# Patient Record
Sex: FEMALE | Race: WHITE
[De-identification: names, ages, dates, MRNs, and addresses within clinical notes are randomized per-mention and may not be internally consistent; named-entity substitution may affect disease eponyms.]

---

## 2021-01-24 ENCOUNTER — HOSPITAL ENCOUNTER (INPATIENT)
Dept: HOSPITAL 53 - M ED | Age: 66
LOS: 3 days | Discharge: HOME | DRG: 337 | End: 2021-01-27
Attending: FAMILY MEDICINE | Admitting: GENERAL PRACTICE
Payer: MEDICARE

## 2021-01-24 VITALS — BODY MASS INDEX: 22.73 KG/M2 | WEIGHT: 128.31 LBS | HEIGHT: 63 IN

## 2021-01-24 VITALS — DIASTOLIC BLOOD PRESSURE: 77 MMHG | SYSTOLIC BLOOD PRESSURE: 157 MMHG

## 2021-01-24 DIAGNOSIS — I10: ICD-10-CM

## 2021-01-24 DIAGNOSIS — K21.9: ICD-10-CM

## 2021-01-24 DIAGNOSIS — F17.200: ICD-10-CM

## 2021-01-24 DIAGNOSIS — Z88.6: ICD-10-CM

## 2021-01-24 DIAGNOSIS — K56.52: Primary | ICD-10-CM

## 2021-01-24 DIAGNOSIS — Z79.899: ICD-10-CM

## 2021-01-24 DIAGNOSIS — Z90.49: ICD-10-CM

## 2021-01-24 DIAGNOSIS — Z88.5: ICD-10-CM

## 2021-01-24 LAB
ALBUMIN SERPL BCG-MCNC: 3.8 GM/DL (ref 3.2–5.2)
ALT SERPL W P-5'-P-CCNC: 20 U/L (ref 12–78)
BASOPHILS # BLD AUTO: 0 10^3/UL (ref 0–0.2)
BASOPHILS NFR BLD AUTO: 0.3 % (ref 0–1)
BILIRUB CONJ SERPL-MCNC: 0.1 MG/DL (ref 0–0.2)
BILIRUB SERPL-MCNC: 0.5 MG/DL (ref 0.2–1)
EOSINOPHIL # BLD AUTO: 0 10^3/UL (ref 0–0.5)
EOSINOPHIL NFR BLD AUTO: 0.1 % (ref 0–3)
HCT VFR BLD AUTO: 40.4 % (ref 36–47)
HGB BLD-MCNC: 13.2 G/DL (ref 12–15.5)
LIPASE SERPL-CCNC: 85 U/L (ref 73–393)
LYMPHOCYTES # BLD AUTO: 2.2 10^3/UL (ref 1.5–5)
LYMPHOCYTES NFR BLD AUTO: 18.6 % (ref 24–44)
MCH RBC QN AUTO: 29.7 PG (ref 27–33)
MCHC RBC AUTO-ENTMCNC: 32.7 G/DL (ref 32–36.5)
MCV RBC AUTO: 91 FL (ref 80–96)
MONOCYTES # BLD AUTO: 1.3 10^3/UL (ref 0–0.8)
MONOCYTES NFR BLD AUTO: 11.1 % (ref 0–5)
NEUTROPHILS # BLD AUTO: 8.3 10^3/UL (ref 1.5–8.5)
NEUTROPHILS NFR BLD AUTO: 69.6 % (ref 36–66)
PLATELET # BLD AUTO: 376 10^3/UL (ref 150–450)
PROT SERPL-MCNC: 7 GM/DL (ref 6.4–8.2)
RBC # BLD AUTO: 4.44 10^6/UL (ref 4–5.4)
WBC # BLD AUTO: 11.9 10^3/UL (ref 4–10)

## 2021-01-24 RX ADMIN — NICOTINE SCH PATCH: 7 PATCH, EXTENDED RELEASE TRANSDERMAL at 20:32

## 2021-01-24 RX ADMIN — POTASSIUM CHLORIDE, DEXTROSE MONOHYDRATE AND SODIUM CHLORIDE SCH MLS/HR: 75; 5; 450 INJECTION, SOLUTION INTRAVENOUS at 20:16

## 2021-01-24 NOTE — HPEPDOC
[facility] Medical History & Physical


Date of Admission


2021


Date of Service:  2021





History and Physical


CHIEF COMPLAINT: Nausea, vomiting, abdominal distention for 3 days





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

gastroesophageal reflux disease, exploratory laparotomy for peritonitis that is 

post cholecystectomy presents to the emergency room with 3 day history of nause

a, vomiting, abdominal distention, unable to take oral intake without prior 

episodes in the past. Patient denies any history of hernias. No medications were

taken at home, abdominal discomfort, worse when she ambulates better when she 

lays still in the ER she was found to have bowel obstruction, general surgeon, 

Dr. Infante consulted and recommended conservative management with nothing by 

mouth status IV fluids, nasogastric tube placement to low intermittent suction. 

Hospitalist was called to admit the patient. She denies chest pain, pressure, 

tightness, fever, chills, shortness of breath, dysuria, urgency, frequency, 

bilateral upper and lower extremity weakness, paresthesias, depression, anxiety,

sore throat, tinnitus, changes in vision. 12 point review of system otherwise 

negative





PAST MEDICAL HISTORY:


Hypertension, reflux, peritonitis 





PAST SURGICAL HISTORY:


Cholecystostomy exploratory laparotomy for peritonitis, status post cystectomy





SOCIAL HISTORY:


Smokes one fourth pack per day to a half a pack per day since age of 18. Retired

RN. Denies any alcohol use. Full Code





FAMILY HISTORY:


Father:  renal cell carcinoma. Age 83 


Mother: , CVA age 60








ALLERGIES: Please see below.





REVIEW OF SYSTEMS:


12 point review of system negative assessment positive findings in history of 

presenting illness





HOME MEDICATIONS: Please see below. 





PHYSICAL EXAMINATION:


VITAL SIGNS: See below


GENERAL APPEARANCE: No respiratory distress. Awake, alert, oriented 3, 

nasogastric tube in place


HEENT: No JVD, no thyromegaly. Pupils equally round and reactive to light 

accommodation. Dry mucous membranes.


CARDIOVASCULAR: S1, S2, sinus rhythm, no murmurs, rubs or gallops


LUNGS: Clear to auscultation. No wheezing, rales or rhonchi


ABDOMEN: Nasogastric tube in place Soft, distended. Hypoactive bowel sounds


EXTREMITIES: No cyanosis, clubbing or pitting edema 





LABORATORY DATA: See below.





MICROBIOLOGY: Please see below. 





ASSESSMENT: 


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 

disease, exploratory laparotomy for peritonitis that is post cholecystectomy 

presents to the emergency room with 3 day history of nausea, vomiting, abdominal

distention, unable to take oral intake without prior episodes in the past. 

Patient denies any history of hernias. No medications were taken at home, 

abdominal discomfort, worse when she ambulates better when she lays still in the

ER she was found to have bowel obstruction, general surgeon, Dr. Infante consult

ed and recommended conservative management with nothing by mouth status IV 

fluids, nasogastric tube placement to low intermittent suction. Hospitalist was 

called to admit the patient. She denies chest pain, pressure, tightness, fever, 

chills, shortness of breath, dysuria, urgency, frequency, bilateral upper and 

lower extremity weakness, paresthesias, depression, anxiety, sore throat, 

tinnitus, changes in vision. 12 point review of system otherwise negative.. She 

has admitted as an inpatient for two midnights for small bowel obstruction





Small bowel obstruction


General surgeon, Dr.Daniel Costa has been consulted. She'll be kept nothing by 

mouth with IV fluids, antiemetics when necessary pain meds, NG tube to low 

intermittent suction. Monitor electrolytes and supplement intravenously as nee

ded IV Zofran 4 hourly





 Hypertension, controlled


May use nitroglycerin 2% topically every 4 hourly systolic pressures greater 

than 150 or diastolic greater than 90, due to nothing by mouth status.





Gastroesophageal reflux


Protonix X 40 mg IV daily





Active tobacco abuse


Tobacco cessation counseling. Nicotine replacement therapy





DVT prophylaxis


Compression stockings





CODE STATUS full code





Vital Signs





Vital Signs








  Date Time  Temp Pulse Resp B/P (MAP) Pulse Ox O2 Delivery O2 Flow Rate FiO2


 


21 15:49        


 


21 14:35 98.8 82 18  98 Room Air  











Laboratory Data


Labs 24H


Laboratory Tests 2


21 15:39: 


Immature Granulocyte % (Auto) 0.3, Neutrophils (%) (Auto) 69.6H, Lymphocytes (%)

(Auto) 18.6L, Monocytes (%) (Auto) 11.1H, Eosinophils (%) (Auto) 0.1, Basophils 

(%) (Auto) 0.3, Neutrophils # (Auto) 8.3, Lymphocytes # (Auto) 2.2, Monocytes # 

(Auto) 1.3H, Eosinophils # (Auto) 0.0, Basophils # (Auto) 0.0, Nucleated Red 

Blood Cells % (auto) 0.0, Urine Color YELLOW, Urine Appearance HAZY, Urine pH 

5.0, Urine Specific Gravity 1.028, Urine Protein 1+H, Urine Glucose (UA) 

NEGATIVE, Urine Ketones TRACEH, Urine Blood 2+H, Urine Nitrite NEGATIVE, Urine 

Bilirubin NEGATIVE, Urine Urobilinogen 0.2, Urine Leukocyte Esterase NEGATIVE, 

Urine WBC (Auto) 2, Urine RBC (Auto) 2, Urine Hyaline Casts (Auto) 0, Urine 

Bacteria (Auto) NEGATIVE, Urine Squamous Epithelial Cells 0, Urine Mucus (Auto) 

SMALL, Urine Sperm (Auto) , Total Bilirubin 0.5, Direct Bilirubin 0.1, Aspartate

Amino Transf (AST/SGOT) 16, Alanine Aminotransferase (ALT/SGPT) 20, Alkaline 

Phosphatase 78, Total Protein 7.0, Albumin 3.8, Albumin/Globulin Ratio 1.2, 

Lipase 85


21 16:02: POC Lactate (Misc Panel) 1.12


21 16:07: 


POC Glucose (Misc Panel) 100, POC Sodium (Misc Panel) 136, POC Potassium (Misc 

Panel) 3.7, POC Chloride (Misc Panel) 98, POC Total CO2 (Misc Panel) 30.0H, POC 

Blood Urea Nitrogen (Misc Panel 22, POC Ionized Calcium (Misc Panel) 4.5, POC 

Creatinine (Misc Panel) 0.8, POC Hematocrit (Misc Panel) 41.0


CBC/BMP


Laboratory Tests


21 15:39











Home Medications


Scheduled


Lansoprazole (Lansoprazole) 30 Mg Capsule.dr, 30 MG PO DAILY


Valsartan (Valsartan) 320 Mg Tablet, 320 MG PO DAILY





Scheduled PRN


Hydrochlorothiazide (Hydrochlorothiazide) 12.5 Mg Tablet, 12.5 MG PO DAILY PRN 

for FLUID RETENTION





Allergies


Coded Allergies:  


     morphine (Verified  Allergy, Mild, Itching, 21)


     NSAIDS (Non-Steroidal Anti-Inflamma (Verified  Allergy, Unknown, Hx of 

Gastritis, 21)





A-FIB/CHADSVASC


A-FIB History


Current/History of A-Fib/PAF?:  No


Current PO Anticoag Therapy:  No





Age/Risk Factor Scoring


CHADSVASC:  








CHADSVASC Response (Comments) Value


 


Age Risk Factor Age 65-74 years old 1


 


Gender Risk Factor Female 1


 


Hx of HTN Yes 1


 


Hx of Stroke/TIA/or VTE No 0


 


Hx of Diabetes No 0


 


Hx of Vascular Disease No 0


 


Total  3











Treatment


Treatment ordered:  NONE











MIRIAM GARRETT MD            2021 17:44

## 2021-01-24 NOTE — REPVR
PROCEDURE INFORMATION: 

Exam: XR Abdomen, 1 View 

Exam date and time: 1/24/2021 5:55 PM 

Age: 65 years old 

Clinical indication: Device placement; Gi device; Nasogastric tube; Additional 

info: Sbo 



TECHNIQUE: 

Imaging protocol: XR of the abdomen. 

Views: Frontal supine view of the abdomen. 1 View. 



COMPARISON: 

No relevant prior studies available. 



FINDINGS: 

Tubes, catheters and devices: Tip of NG tube located in the left upper quadrant 

consistent with intragastric location. 



Gastrointestinal tract: Multiple dilated loops of small bowel consistent with 

small-bowel obstruction. 

Organs: There has been a cholecystectomy. 

Bones/joints: Arthropathic changes both hip joints. 



IMPRESSION: 

1. Multiple dilated loops of small bowel consistent with small-bowel 

obstruction. 

2. There has been a cholecystectomy. 



Electronically signed by: Carlos Alberto Mckinley On 01/24/2021  18:08:17 PM

## 2021-01-25 VITALS — DIASTOLIC BLOOD PRESSURE: 72 MMHG | SYSTOLIC BLOOD PRESSURE: 125 MMHG

## 2021-01-25 VITALS — DIASTOLIC BLOOD PRESSURE: 72 MMHG | SYSTOLIC BLOOD PRESSURE: 126 MMHG

## 2021-01-25 VITALS — SYSTOLIC BLOOD PRESSURE: 132 MMHG | DIASTOLIC BLOOD PRESSURE: 70 MMHG

## 2021-01-25 LAB
BASOPHILS # BLD AUTO: 0 10^3/UL (ref 0–0.2)
BASOPHILS NFR BLD AUTO: 0.6 % (ref 0–1)
BUN SERPL-MCNC: 19 MG/DL (ref 7–18)
CALCIUM SERPL-MCNC: 8.1 MG/DL (ref 8.8–10.2)
CHLORIDE SERPL-SCNC: 104 MEQ/L (ref 98–107)
CO2 SERPL-SCNC: 28 MEQ/L (ref 21–32)
CREAT SERPL-MCNC: 0.6 MG/DL (ref 0.55–1.3)
EOSINOPHIL # BLD AUTO: 0.1 10^3/UL (ref 0–0.5)
EOSINOPHIL NFR BLD AUTO: 1.5 % (ref 0–3)
GFR SERPL CREATININE-BSD FRML MDRD: > 60 ML/MIN/{1.73_M2} (ref 45–?)
GLUCOSE SERPL-MCNC: 104 MG/DL (ref 70–100)
HCT VFR BLD AUTO: 33.6 % (ref 36–47)
HGB BLD-MCNC: 10.9 G/DL (ref 12–15.5)
LYMPHOCYTES # BLD AUTO: 2.3 10^3/UL (ref 1.5–5)
LYMPHOCYTES NFR BLD AUTO: 32.2 % (ref 24–44)
MAGNESIUM SERPL-MCNC: 2 MG/DL (ref 1.8–2.4)
MCH RBC QN AUTO: 29.8 PG (ref 27–33)
MCHC RBC AUTO-ENTMCNC: 32.4 G/DL (ref 32–36.5)
MCV RBC AUTO: 91.8 FL (ref 80–96)
MONOCYTES # BLD AUTO: 0.8 10^3/UL (ref 0–0.8)
MONOCYTES NFR BLD AUTO: 11.4 % (ref 0–5)
NEUTROPHILS # BLD AUTO: 3.9 10^3/UL (ref 1.5–8.5)
NEUTROPHILS NFR BLD AUTO: 53.9 % (ref 36–66)
PLATELET # BLD AUTO: 310 10^3/UL (ref 150–450)
POTASSIUM SERPL-SCNC: 3.4 MEQ/L (ref 3.5–5.1)
RBC # BLD AUTO: 3.66 10^6/UL (ref 4–5.4)
SODIUM SERPL-SCNC: 136 MEQ/L (ref 136–145)
WBC # BLD AUTO: 7.2 10^3/UL (ref 4–10)

## 2021-01-25 RX ADMIN — ONDANSETRON SCH MG: 2 INJECTION INTRAMUSCULAR; INTRAVENOUS at 16:12

## 2021-01-25 RX ADMIN — SUCRALFATE SCH GM: 1 SUSPENSION ORAL at 18:11

## 2021-01-25 RX ADMIN — ONDANSETRON SCH MG: 2 INJECTION INTRAMUSCULAR; INTRAVENOUS at 09:21

## 2021-01-25 RX ADMIN — POTASSIUM CHLORIDE, DEXTROSE MONOHYDRATE AND SODIUM CHLORIDE SCH MLS/HR: 300; 5; 450 INJECTION, SOLUTION INTRAVENOUS at 22:53

## 2021-01-25 RX ADMIN — DEXTROSE MONOHYDRATE SCH MG: 50 INJECTION, SOLUTION INTRAVENOUS at 09:16

## 2021-01-25 RX ADMIN — SUCRALFATE SCH GM: 1 SUSPENSION ORAL at 23:50

## 2021-01-25 RX ADMIN — POTASSIUM CHLORIDE SCH MLS/HR: 7.46 INJECTION, SOLUTION INTRAVENOUS at 10:15

## 2021-01-25 RX ADMIN — ONDANSETRON SCH MG: 2 INJECTION INTRAMUSCULAR; INTRAVENOUS at 22:52

## 2021-01-25 RX ADMIN — NICOTINE SCH PATCH: 7 PATCH, EXTENDED RELEASE TRANSDERMAL at 09:00

## 2021-01-25 RX ADMIN — POTASSIUM CHLORIDE, DEXTROSE MONOHYDRATE AND SODIUM CHLORIDE SCH MLS/HR: 300; 5; 450 INJECTION, SOLUTION INTRAVENOUS at 09:12

## 2021-01-25 RX ADMIN — DEXTROSE MONOHYDRATE SCH MG: 50 INJECTION, SOLUTION INTRAVENOUS at 22:52

## 2021-01-25 RX ADMIN — POTASSIUM CHLORIDE SCH MLS/HR: 7.46 INJECTION, SOLUTION INTRAVENOUS at 09:16

## 2021-01-25 RX ADMIN — POTASSIUM CHLORIDE, DEXTROSE MONOHYDRATE AND SODIUM CHLORIDE SCH MLS/HR: 75; 5; 450 INJECTION, SOLUTION INTRAVENOUS at 06:20

## 2021-01-25 NOTE — IPNPDOC
Date Seen


The patient was seen on 1/25/21.





Progress Note


SUBJECTIVE;


c/o worsening abdominal distension with persistent nausea and bloody drainage on

ng tube overnight.


no fever vomiting. passing flatus today. per surgery, continue npo status. 








PHYSICAL EXAMINATION:


VITAL SIGNS: See below


GENERAL APPEARANCE: No pallor or cyanosis no respiratory distress. Awake, alert,

oriented 3, nasogastric tube in place


HEENT: No JVD, no thyromegaly. Pupils equally round and reactive to light 

accommodation. Dry mucous membranes.


CARDIOVASCULAR: S1, S2, sinus rhythm, no murmurs, rubs or gallops


LUNGS: Clear to auscultation. No wheezing, rales or rhonchi


ABDOMEN: Nasogastric tube in place blood-tinged Soft,more distended. Hyperactive

bowel [id number]


EXTREMITIES: No cyanosis, clubbing or pitting edema 





LABORATORY DATA: See below.





MICROBIOLOGY: Please see below. 





ASSESSMENT: 


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 

disease, exploratory laparotomy for peritonitis that is post cholecystectomy 

presents to the emergency room with 3 day history of nausea, vomiting, abdominal

distention, unable to take oral intake without prior episodes in the past. 

Patient denies any history of hernias. No medications were taken at home, 

abdominal discomfort, worse when she ambulates better when she lays still in the

ER she was found to have bowel obstruction, general surgeon, Dr. Infante 

consulted and recommended conservative management with nothing by mouth status 

IV fluids, nasogastric tube placement to low intermittent suction. Hospitalist 

was called to admit the patient. She denies chest pain, pressure, tightness, 

fever, chills, shortness of breath, dysuria, urgency, frequency, bilateral upper

and lower extremity weakness, paresthesias, depression, anxiety, sore throat, 

tinnitus, changes in vision. 12 point review of system otherwise negative.. She 

has admitted as an inpatient for two midnights for small bowel obstruction





Small bowel obstruction


not ready to advance diet due to increased abd distention


General surgeon, Dr.Daniel Costa has been consulted. She'll be kept nothing by 

mouth with IV fluids,  NG tube to low intermittent suction. Monitoring  

electrolytes and supplement intravenously 





Blood-tinged NG tube suctioning


increase protonix to iv bid





 Hypertension, controlled


May use nitroglycerin 2% topically every 4 hourly systolic pressures greater 

than 150 or diastolic greater than 90, due to nothing by mouth status.





Gastroesophageal reflux


Protonix X 40 mg IV bid 





Active tobacco abuse


Tobacco cessation counseling. Nicotine replacement therapy





DVT prophylaxis


Compression stockings





CODE STATUS full code





VS, I&O, 24H, Fishbone


Vital Signs/I&O





Vital Signs








  Date Time  Temp Pulse Resp B/P (MAP) Pulse Ox O2 Delivery O2 Flow Rate FiO2


 


1/25/21 06:00 98.1 61 20 125/72 (89) 96   


 


1/24/21 22:00      Room Air  














I&O- Last 24 Hours up to 6 AM 


 


 1/25/21





 06:00


 


Intake Total 900 ml


 


Output Total 200 ml


 


Balance 700 ml











Laboratory Data


24H LABS


Laboratory Tests 2


1/24/21 15:39: 


Immature Granulocyte % (Auto) 0.3, Neutrophils (%) (Auto) 69.6H, Lymphocytes (%)

(Auto) 18.6L, Monocytes (%) (Auto) 11.1H, Eosinophils (%) (Auto) 0.1, Basophils 

(%) (Auto) 0.3, Neutrophils # (Auto) 8.3, Lymphocytes # (Auto) 2.2, Monocytes # 

(Auto) 1.3H, Eosinophils # (Auto) 0.0, Basophils # (Auto) 0.0, Nucleated Red 

Blood Cells % (auto) 0.0, Urine Color YELLOW, Urine Appearance HAZY, Urine pH 

5.0, Urine Specific Gravity 1.028, Urine Protein 1+H, Urine Glucose (UA) 

NEGATIVE, Urine Ketones TRACEH, Urine Blood 2+H, Urine Nitrite NEGATIVE, Urine 

Bilirubin NEGATIVE, Urine Urobilinogen 0.2, Urine Leukocyte Esterase NEGATIVE, 

Urine WBC (Auto) 2, Urine RBC (Auto) 2, Urine Hyaline Casts (Auto) 0, Urine 

Bacteria (Auto) NEGATIVE, Urine Squamous Epithelial Cells 0, Urine Mucus (Auto) 

SMALL, Urine Sperm (Auto) , Total Bilirubin 0.5, Direct Bilirubin 0.1, Aspartate

Amino Transf (AST/SGOT) 16, Alanine Aminotransferase (ALT/SGPT) 20, Alkaline 

Phosphatase 78, Total Protein 7.0, Albumin 3.8, Albumin/Globulin Ratio 1.2, 

Lipase 85


1/24/21 16:02: POC Lactate (Misc Panel) 1.12


1/24/21 16:07: 


POC Glucose (Misc Panel) 100, POC Sodium (Misc Panel) 136, POC Potassium (Misc 

Panel) 3.7, POC Chloride (Misc Panel) 98, POC Total CO2 (Misc Panel) 30.0H, POC 

Blood Urea Nitrogen (Misc Panel 22, POC Ionized Calcium (Misc Panel) 4.5, POC 

Creatinine (Misc Panel) 0.8, POC Hematocrit (Misc Panel) 41.0


1/25/21 05:39: 


Immature Granulocyte % (Auto) 0.4, Neutrophils (%) (Auto) 53.9, Lymphocytes (%) 

(Auto) 32.2, Monocytes (%) (Auto) 11.4H, Eosinophils (%) (Auto) 1.5, Basophils 

(%) (Auto) 0.6, Neutrophils # (Auto) 3.9, Lymphocytes # (Auto) 2.3, Monocytes # 

(Auto) 0.8, Eosinophils # (Auto) 0.1, Basophils # (Auto) 0.0, Nucleated Red 

Blood Cells % (auto) 0.0, Anion Gap 4L, Glomerular Filtration Rate > 60.0, 

Calcium Level 8.1L, Magnesium Level 2.0


CBC/BMP


Laboratory Tests


1/24/21 15:39








1/25/21 05:39

















MIRIAM GARRETT MD            Jan 25, 2021 09:08

## 2021-01-25 NOTE — REP
INDICATION:

NG tube placement



COMPARISON:

None.



TECHNIQUE:

Portable AP view of the chest



FINDINGS:

Nasogastric tube courses below the left hemidiaphragm.



The mediastinum and cardiac silhouette are stable and within normal limits for

portable technique.  The lung fields are clear without acute consolidation, effusion,

or pneumothorax.  Skeletal structures are intact.



IMPRESSION:

No acute cardiopulmonary process appreciated.

No free air below diaphragm to suspect pneumoperitoneum.





<Electronically signed by Ricco Gee > 01/25/21 0781

## 2021-01-25 NOTE — CR
CONSULTATION

DATE: 01/24/2021



CHIEF COMPLAINT:   Abdominal pain.



HISTORY OF PRESENT ILLNESS:  The patient is a 65-year-old female who presented

as a transfer from [facility]. She actually drove herself down but she

initially was up there, was diagnosed with a small bowel obstruction in the

upper abdomen. She came down here for further evaluation. She claims for the

past couple of days she has had nausea, vomiting and abdominal distention. She

has not had a bowel movement in over 24 hours and she has had a history of

extensive surgery in the past. 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

her symptoms that she most likely had a bowel obstruction and therefore she

came to the hospital for evaluation. She has already got an NG-tube in the

Emergency Room. She is already starting to feel some improvement from that. Her

labs are stable. Lactic acid is normal. Therefore, she was admitted to the

Hospitalist overnight. I was able to see her in the Emergency Room prior to her

being admitted. Her abdomen was soft and slightly distended but otherwise

non-peritoneal. She was stable enough to be admitted without having immediate

surgery. She denies having any prior issues with small bowel obstructions in

the past. She did have an extensive surgery for a laparoscopic cholecystectomy

with a bile leak that resulted in postop complications and extensive surgery

from that so she does have a high risk for adhesions. 



PAST MEDICAL HISTORY:  Hypertension, reflux, peritonitis.



PAST SURGICAL HISTORY: Cholecystectomy, exploratory laparotomy for peritonitis

from her cholecystectomy.



SOCIAL HISTORY: She smokes 1/4 pack a day. Denies drug or alcohol abuse. 



FAMILY HISTORY: Noncontributory.



ALLERGIES: NSAIDs and Morphine. 



MEDICATIONS: Please see med req.



REVIEW OF SYSTEMS: Pertinent positives and negatives as stated in the HPI.  



PHYSICAL EXAMINATION:

GENERAL: Alert and oriented x3, in no acute distress.

VITAL SIGNS: Temperature is 98.1, pulse 61, respirations 20, blood pressure

125/72, pulse oximetry 96% on room air.

HEENT: Pupils equally round and reactive to light and accommodation.

HEART: S1 and S2, regular rate and rhythm. 

LUNGS: Clear to auscultation bilaterally.

ABDOMEN: Soft, slight tenderness to palpation in the upper abdomen and down

into the left side. No rebound or guarding. No signs of peritonitis. No ventral

hernias. There is slight distention.

EXTREMITIES: No cyanosis, clubbing or edema. 



LABORATORY DATA: White count 11.9,  hemoglobin 13.2, platelets are 376,000. The

lactic acid was 1.12. 



Abdominal x-ray just after NG-tube placement showed the tip of the NG in the

left upper quadrant. Multiple dilated loops of small bowel consistent with

small bowel obstruction. 



ASSESSMENT AND PLAN: The patient is a 65-year-old female with a history of

extensive abdominal surgery back in the 90s, now presents with signs of a

partial versus complete small bowel obstruction that is in the upper abdomen

towards the left side. Recommendation at this time is to continue with medical

management, NG-tube decompression, frequent ambulation, will continue to

monitor her closely. If she does not improve within the next 48 to 72 hours

then I will discuss elective surgery with her to go in and resolve the

obstruction.

## 2021-01-25 NOTE — IPNPDOC
Text Note


Date of Service


The patient was seen on 1/25/21.





NOTE


No acute events overnight. Her pain went away, and then came back slightly. NG 

is in place with some bloody output, but she said the insertion was traumatic. 

She is also passing some gas. 





VSSAF





NAD





abd - soft, slight diffuse tenderness, and slight distention today that is more 

than yesterday. 





labs - below





A) 66y/o female with partial vs. complete sbo likely secondary to adhesions





P) sips and chips


ambulate


ngt to LIS


repeat xray in the am, and if no improvement from yesterday then we will 

consider surgery tomorrow. 





James Costa DO





VS,Fishbone, I+O


VS, Fishbone, I+O


Laboratory Tests


1/24/21 15:39








1/25/21 05:39











Vital Signs








  Date Time  Temp Pulse Resp B/P (MAP) Pulse Ox O2 Delivery O2 Flow Rate FiO2


 


1/25/21 06:00 98.1 61 20 125/72 (89) 96   


 


1/24/21 22:00      Room Air  














I&O- Last 24 Hours up to 6 AM 


 


 1/25/21





 06:00


 


Intake Total 900 ml


 


Output Total 200 ml


 


Balance 700 ml

















MEET COSTA DO            Jan 25, 2021 08:26

## 2021-01-26 VITALS — DIASTOLIC BLOOD PRESSURE: 83 MMHG | SYSTOLIC BLOOD PRESSURE: 151 MMHG

## 2021-01-26 VITALS — DIASTOLIC BLOOD PRESSURE: 72 MMHG | SYSTOLIC BLOOD PRESSURE: 124 MMHG

## 2021-01-26 VITALS — DIASTOLIC BLOOD PRESSURE: 84 MMHG | SYSTOLIC BLOOD PRESSURE: 147 MMHG

## 2021-01-26 VITALS — DIASTOLIC BLOOD PRESSURE: 56 MMHG | SYSTOLIC BLOOD PRESSURE: 111 MMHG

## 2021-01-26 VITALS — DIASTOLIC BLOOD PRESSURE: 62 MMHG | SYSTOLIC BLOOD PRESSURE: 109 MMHG

## 2021-01-26 VITALS — DIASTOLIC BLOOD PRESSURE: 61 MMHG | SYSTOLIC BLOOD PRESSURE: 113 MMHG

## 2021-01-26 VITALS — SYSTOLIC BLOOD PRESSURE: 122 MMHG | DIASTOLIC BLOOD PRESSURE: 71 MMHG

## 2021-01-26 LAB
BASOPHILS # BLD AUTO: 0 10^3/UL (ref 0–0.2)
BASOPHILS NFR BLD AUTO: 0.4 % (ref 0–1)
BUN SERPL-MCNC: 9 MG/DL (ref 7–18)
CALCIUM SERPL-MCNC: 8.4 MG/DL (ref 8.8–10.2)
CHLORIDE SERPL-SCNC: 107 MEQ/L (ref 98–107)
CO2 SERPL-SCNC: 27 MEQ/L (ref 21–32)
CREAT SERPL-MCNC: 0.57 MG/DL (ref 0.55–1.3)
EOSINOPHIL # BLD AUTO: 0.2 10^3/UL (ref 0–0.5)
EOSINOPHIL NFR BLD AUTO: 2.1 % (ref 0–3)
GFR SERPL CREATININE-BSD FRML MDRD: > 60 ML/MIN/{1.73_M2} (ref 45–?)
GLUCOSE SERPL-MCNC: 106 MG/DL (ref 70–100)
HCT VFR BLD AUTO: 33.6 % (ref 36–47)
HGB BLD-MCNC: 10.9 G/DL (ref 12–15.5)
LYMPHOCYTES # BLD AUTO: 1.6 10^3/UL (ref 1.5–5)
LYMPHOCYTES NFR BLD AUTO: 22.6 % (ref 24–44)
MAGNESIUM SERPL-MCNC: 2.1 MG/DL (ref 1.8–2.4)
MCH RBC QN AUTO: 29.7 PG (ref 27–33)
MCHC RBC AUTO-ENTMCNC: 32.4 G/DL (ref 32–36.5)
MCV RBC AUTO: 91.6 FL (ref 80–96)
MONOCYTES # BLD AUTO: 1 10^3/UL (ref 0–0.8)
MONOCYTES NFR BLD AUTO: 13.6 % (ref 0–5)
NEUTROPHILS # BLD AUTO: 4.4 10^3/UL (ref 1.5–8.5)
NEUTROPHILS NFR BLD AUTO: 61 % (ref 36–66)
PLATELET # BLD AUTO: 307 10^3/UL (ref 150–450)
POTASSIUM SERPL-SCNC: 4.3 MEQ/L (ref 3.5–5.1)
RBC # BLD AUTO: 3.67 10^6/UL (ref 4–5.4)
SODIUM SERPL-SCNC: 139 MEQ/L (ref 136–145)
WBC # BLD AUTO: 7.2 10^3/UL (ref 4–10)

## 2021-01-26 PROCEDURE — 0DNH4ZZ RELEASE CECUM, PERCUTANEOUS ENDOSCOPIC APPROACH: ICD-10-PCS | Performed by: SURGERY

## 2021-01-26 PROCEDURE — 8E0W4CZ ROBOTIC ASSISTED PROCEDURE OF TRUNK REGION, PERCUTANEOUS ENDOSCOPIC APPROACH: ICD-10-PCS | Performed by: SURGERY

## 2021-01-26 PROCEDURE — 0DNB4ZZ RELEASE ILEUM, PERCUTANEOUS ENDOSCOPIC APPROACH: ICD-10-PCS | Performed by: SURGERY

## 2021-01-26 PROCEDURE — 0DNN4ZZ RELEASE SIGMOID COLON, PERCUTANEOUS ENDOSCOPIC APPROACH: ICD-10-PCS | Performed by: SURGERY

## 2021-01-26 RX ADMIN — DEXTROSE MONOHYDRATE SCH MG: 50 INJECTION, SOLUTION INTRAVENOUS at 21:19

## 2021-01-26 RX ADMIN — ONDANSETRON SCH MG: 2 INJECTION INTRAMUSCULAR; INTRAVENOUS at 21:21

## 2021-01-26 RX ADMIN — ONDANSETRON SCH MG: 2 INJECTION INTRAMUSCULAR; INTRAVENOUS at 09:28

## 2021-01-26 RX ADMIN — ONDANSETRON SCH MG: 2 INJECTION INTRAMUSCULAR; INTRAVENOUS at 05:30

## 2021-01-26 RX ADMIN — NICOTINE SCH PATCH: 7 PATCH, EXTENDED RELEASE TRANSDERMAL at 09:00

## 2021-01-26 RX ADMIN — ONDANSETRON SCH MG: 2 INJECTION INTRAMUSCULAR; INTRAVENOUS at 17:09

## 2021-01-26 RX ADMIN — ONDANSETRON SCH MG: 2 INJECTION INTRAMUSCULAR; INTRAVENOUS at 21:19

## 2021-01-26 RX ADMIN — SUCRALFATE SCH GM: 1 SUSPENSION ORAL at 05:30

## 2021-01-26 RX ADMIN — POTASSIUM CHLORIDE, DEXTROSE MONOHYDRATE AND SODIUM CHLORIDE SCH MLS/HR: 300; 5; 450 INJECTION, SOLUTION INTRAVENOUS at 09:28

## 2021-01-26 RX ADMIN — POTASSIUM CHLORIDE, DEXTROSE MONOHYDRATE AND SODIUM CHLORIDE SCH MLS/HR: 300; 5; 450 INJECTION, SOLUTION INTRAVENOUS at 17:08

## 2021-01-26 RX ADMIN — SUCRALFATE SCH GM: 1 SUSPENSION ORAL at 18:00

## 2021-01-26 RX ADMIN — SUCRALFATE SCH GM: 1 SUSPENSION ORAL at 12:31

## 2021-01-26 RX ADMIN — DEXTROSE MONOHYDRATE SCH MG: 50 INJECTION, SOLUTION INTRAVENOUS at 09:28

## 2021-01-26 NOTE — REP
INDICATION:

sbo.



COMPARISON:

01/24/2021



TECHNIQUE:

Two supine views of the abdomen and pelvis



FINDINGS:

The nasogastric tube side port is at the level of diaphragm and warrants advancement.



The bowel gas pattern demonstrates mildly dilated air-filled loops of bowel which may

reflect focal/partial small-bowel obstruction.  No obvious free air is identified.

Surgical clips in the right upper quadrant consistent with prior cholecystectomy.



IMPRESSION:

Mildly prominent air-filled loops of bowel similar to prior examination raise the

possibility of early/partial small bowel obstruction.





<Electronically signed by Ricco Gee > 01/26/21 0814

## 2021-01-26 NOTE — IPNPDOC
Text Note


Date of Service


The patient was seen on 1/26/21.





NOTE


No acute events overnight. Her pain went away, and she is still passing gas and 

some small BM. However, her xray is still not showing improvement. 





VSSAF





NAD





abd - soft, slight diffuse tenderness, and slight distention today





labs - below





A) 66y/o female with partial vs. complete sbo likely secondary to adhesions





P)npo


ambulate


ngt to LIS


OR this afternoon for RA DAVIDSON. 





James Costa DO





VS,Fishbone, I+O


VS, Fishbone, I+O


Laboratory Tests


1/26/21 06:54











Vital Signs








  Date Time  Temp Pulse Resp B/P (MAP) Pulse Ox O2 Delivery O2 Flow Rate FiO2


 


1/26/21 06:00 98.5 66 17 151/83 (105) 98 Room Air  














I&O- Last 24 Hours up to 6 AM 


 


 1/26/21





 05:59


 


Intake Total 1900 ml


 


Output Total 1600 ml


 


Balance 300 ml

















MEET COSTA DO            Jan 26, 2021 09:18

## 2021-01-26 NOTE — RO
OPERATIVE NOTE



DATE OF OPERATION: 01/26/2021



PREOPERATIVE DIAGNOSIS: Small-bowel obstruction.



POSTOPERATIVE DIAGNOSIS: Small-bowel obstruction secondary to extensive

abdominal adhesions. 



PROCEDURE: Robotic assisted lysis of adhesions for about an hour and a half

with release of small-bowel obstruction.  



SURGEON: Jarvis Costa DO



ASSISTANT: None.



ANESTHESIA:  General 



ESTIMATED BLOOD LOSS:  5 ml.



COMPLICATIONS:  None.



INDICATION FOR PROCEDURE:  The patient is a 65-year-old female who presents

with abdominal pains and abdominal distension and found to have obstruction on

CT.   Recommendation was to proceed with robotic lysis of adhesions after

failing medical management.  Risks and benefits of procedure not limited to,

but included bleeding, infection, hernia formation, damage to surrounding

structures, need for further surgery.  This was discussed in detail with the

patient.  Informed consent was obtained and procedure was planned.  



DESCRIPTION OF PROCEDURE: The patient brought back to the operating room 7. 

After sufficient sedation, the abdomen as sterilely prepped and draped.  Next

time-out was done to confirm proper patient and proper procedure.  Following

that, a an 8 mm incision was made in the left upper quadrant, Veress needle was

inserted and the abdomen was inflated to 15 mmHg.   The Veress needle was then

removed and an 8 mm Optiview port was used to gain access to the abdomen.  Once

the abdomen was entered, there were extensive adhesions throughout the entire

abdomen.   I was able to place another 8 mm incision in the mid-abdomen. 

Through that port I was able to place laparoscopic scissors, enough to take

down a bunch of adhesions along the midline in the upper abdomen.  Once that

was completed, I was able to place two more 8 mm robotic ports, one in the

midline and one in the right upper quadrant.  The robot was then docked to the

ports next from the console.  Multiple loops of small bowel were dissected from

the anterior abdominal wall.  Once all of those were completed, starting from

the cecum and working proximally, the appendix, the terminal ileum and multiple

loops of small bowel were carefully dissected free.  The dissection was carried

out across the abdomen over to the left lower quadrant where there appeared to

be an extremely dilated loop that was adhesed down onto the sigmoid colon with

a kink in it.  Once that was dissected free, the bowel seems to decompress

rather rapidly leaving me to believe that was likely the source of the

obstruction.  Once that was completed, to avoid risk of injury the abdomen was

desufflated.  Skin incisions were closed with 4-0 Vicryl subcuticular sutures. 

The abdomen was cleaned and dried.  Steri-Strips, 4 x 4 and tape were applied.

## 2021-01-26 NOTE — IPNPDOC
Text Note


Date of Service


The patient was seen on 1/26/21.





NOTE


Subjective:


Patient seen and examined this morning at bedside. Patient tells me that her 

abdominal pain has now resolved with just some residual discomfort overall. She 

has passed gas and a very small piece of stool. She has NG tube to suction which

is still draining. He denies any nausea or vomiting currently. Since her 

abdominal distention is about the same as yesterday. There is no acute overnight

events. She was seen by Dr. Costa this morning who plans to take her to surgery

today.








Objective:


VITAL SIGNS: See below


Constitutional: Awake and alert, in no apparent distress


ENT: Sclera are clear. Mucosa is moist. NG tube in place


Respiratory:  Lungs CTA bilaterally.  No respiratory distress. No use of 

accessory muscles.


Cardiovascular: RRR S1 and S2 are normal, no murmur


Gastrointestinal: Abdomen is soft, mild distention diffusely, mild discomfort on

palpation in all 4 quadrants, BS present hyperactive in all 4 quadrants. 


Musculoskeletal: No edema. No joint deformities. RUE 5/5, LUE 5/5, BLE 5/5


Neurologic: No focal neurological deficit.  


Mental Status: A&O x3, normal affect 


Skin: Warm, dry 





Assessment/plan:


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 

disease, exploratory laparotomy for peritonitis that is post cholecystectomy 

presents to the emergency room with 3 day history of nausea, vomiting, abdominal

distention, unable to take oral intake without prior episodes in the past. 

Patient denies any history of hernias. No medications were taken at home, 

abdominal discomfort, worse when she ambulates better when she lays still in the

ER she was found to have bowel obstruction, general surgeon, Dr. Costa consul

nery and recommended conservative management initially with nothing by mouth 

status IV fluids, nasogastric tube placement to low intermittent suction. Her x-

rays showed no improvement and so she was brought to the OR 1/26 with Dr. Costa.





# Small bowel obstruction: Suspected to be secondary to adhesions. Per Dr. Cotsa Gen. surgery x-rays don't show improvement and patient will be taken to 

OR 1/26. Continue NG tube to low intermittent suction in the meantime. 

Ambulatory distress tolerated. Nothing by mouth. Continue Protonix. PT/OT eval 

postop


# Hypertension: Continue home meds. Monitor and titrate


# GERD: Protonix 40 twice a day


# Active tobacco abuse: Tobacco cessation counseling. Nicotine replacement 

therapy


# DVT prophylaxis: Compression stockings





A Roberto 


Hospitalist





Bryan EDGAR I+O


Bryan EDGAR I+O


Laboratory Tests


1/26/21 06:54











Vital Signs








  Date Time  Temp Pulse Resp B/P (MAP) Pulse Ox O2 Delivery O2 Flow Rate FiO2


 


1/26/21 06:00 98.5 66 17 151/83 (105) 98 Room Air  














I&O- Last 24 Hours up to 6 AM 


 


 1/26/21





 06:00


 


Intake Total 1900 ml


 


Output Total 1800 ml


 


Balance 100 ml

















ABELARDO EWING MD              Jan 26, 2021 07:55

## 2021-01-27 VITALS — SYSTOLIC BLOOD PRESSURE: 116 MMHG | DIASTOLIC BLOOD PRESSURE: 72 MMHG

## 2021-01-27 VITALS — DIASTOLIC BLOOD PRESSURE: 64 MMHG | SYSTOLIC BLOOD PRESSURE: 110 MMHG

## 2021-01-27 VITALS — SYSTOLIC BLOOD PRESSURE: 99 MMHG | DIASTOLIC BLOOD PRESSURE: 63 MMHG

## 2021-01-27 VITALS — SYSTOLIC BLOOD PRESSURE: 110 MMHG | DIASTOLIC BLOOD PRESSURE: 63 MMHG

## 2021-01-27 VITALS — SYSTOLIC BLOOD PRESSURE: 90 MMHG | DIASTOLIC BLOOD PRESSURE: 44 MMHG

## 2021-01-27 LAB
BASOPHILS # BLD AUTO: 0 10^3/UL (ref 0–0.2)
BASOPHILS NFR BLD AUTO: 0.3 % (ref 0–1)
BUN SERPL-MCNC: 6 MG/DL (ref 7–18)
CALCIUM SERPL-MCNC: 9 MG/DL (ref 8.8–10.2)
CHLORIDE SERPL-SCNC: 104 MEQ/L (ref 98–107)
CO2 SERPL-SCNC: 25 MEQ/L (ref 21–32)
CREAT SERPL-MCNC: 0.55 MG/DL (ref 0.55–1.3)
EOSINOPHIL # BLD AUTO: 0.1 10^3/UL (ref 0–0.5)
EOSINOPHIL NFR BLD AUTO: 0.9 % (ref 0–3)
GFR SERPL CREATININE-BSD FRML MDRD: > 60 ML/MIN/{1.73_M2} (ref 45–?)
GLUCOSE SERPL-MCNC: 100 MG/DL (ref 70–100)
HCT VFR BLD AUTO: 32.5 % (ref 36–47)
HGB BLD-MCNC: 10.5 G/DL (ref 12–15.5)
LYMPHOCYTES # BLD AUTO: 1.6 10^3/UL (ref 1.5–5)
LYMPHOCYTES NFR BLD AUTO: 21.4 % (ref 24–44)
MAGNESIUM SERPL-MCNC: 2 MG/DL (ref 1.8–2.4)
MCH RBC QN AUTO: 29.4 PG (ref 27–33)
MCHC RBC AUTO-ENTMCNC: 32.3 G/DL (ref 32–36.5)
MCV RBC AUTO: 91 FL (ref 80–96)
MONOCYTES # BLD AUTO: 0.9 10^3/UL (ref 0–0.8)
MONOCYTES NFR BLD AUTO: 11.8 % (ref 0–5)
NEUTROPHILS # BLD AUTO: 4.8 10^3/UL (ref 1.5–8.5)
NEUTROPHILS NFR BLD AUTO: 65.3 % (ref 36–66)
PLATELET # BLD AUTO: 318 10^3/UL (ref 150–450)
POTASSIUM SERPL-SCNC: 4.6 MEQ/L (ref 3.5–5.1)
RBC # BLD AUTO: 3.57 10^6/UL (ref 4–5.4)
SODIUM SERPL-SCNC: 138 MEQ/L (ref 136–145)
WBC # BLD AUTO: 7.4 10^3/UL (ref 4–10)

## 2021-01-27 RX ADMIN — ONDANSETRON SCH MG: 2 INJECTION INTRAMUSCULAR; INTRAVENOUS at 09:10

## 2021-01-27 RX ADMIN — ONDANSETRON SCH MG: 2 INJECTION INTRAMUSCULAR; INTRAVENOUS at 16:00

## 2021-01-27 RX ADMIN — NICOTINE SCH PATCH: 7 PATCH, EXTENDED RELEASE TRANSDERMAL at 09:00

## 2021-01-27 RX ADMIN — SUCRALFATE SCH GM: 1 SUSPENSION ORAL at 06:27

## 2021-01-27 RX ADMIN — SUCRALFATE SCH GM: 1 SUSPENSION ORAL at 00:55

## 2021-01-27 RX ADMIN — ONDANSETRON SCH MG: 2 INJECTION INTRAMUSCULAR; INTRAVENOUS at 09:20

## 2021-01-27 RX ADMIN — POTASSIUM CHLORIDE, DEXTROSE MONOHYDRATE AND SODIUM CHLORIDE SCH MLS/HR: 300; 5; 450 INJECTION, SOLUTION INTRAVENOUS at 00:54

## 2021-01-27 RX ADMIN — POTASSIUM CHLORIDE, DEXTROSE MONOHYDRATE AND SODIUM CHLORIDE SCH MLS/HR: 300; 5; 450 INJECTION, SOLUTION INTRAVENOUS at 09:20

## 2021-01-27 RX ADMIN — SUCRALFATE SCH GM: 1 SUSPENSION ORAL at 13:08

## 2021-01-27 RX ADMIN — ONDANSETRON SCH MG: 2 INJECTION INTRAMUSCULAR; INTRAVENOUS at 03:52

## 2021-01-27 RX ADMIN — DEXTROSE MONOHYDRATE SCH MG: 50 INJECTION, SOLUTION INTRAVENOUS at 09:19

## 2021-01-27 NOTE — DS.PDOC
Discharge Summary


General


Date of Admission


Jan 24, 2021 at 16:58


Date of Discharge


1/27/2021





Discharge Summary


PROCEDURES PERFORMED DURING STAY: [None].





ADMITTING DIAGNOSES: 


1. Abdominal distention





DISCHARGE DIAGNOSES:


1. Small bowel obstruction secondary to adhesions





COMPLICATIONS/CHIEF COMPLAINT: SBO.





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 

hypertension, gastroesophageal reflux disease, exploratory laparotomy for 

peritonitis that is post cholecystectomy presents to the emergency room with 3 

day history of nausea, vomiting, abdominal distention, unable to take oral 

intake without prior episodes in the past. Patient denies any history of 

hernias. No medications were taken at home, abdominal discomfort, worse when she

 ambulates better when she lays still in the ER she was found to have bowel 

obstruction, general surgeon, Dr. Infante consulted and recommended conservative 

management with nothing by mouth status IV fluids, nasogastric tube placement to

 low intermittent suction. Hospitalist was called to admit the patient. She 

denies chest pain, pressure, tightness, fever, chills, shortness of breath, 

dysuria, urgency, frequency, bilateral upper and lower extremity weakness, 

paresthesias, depression, anxiety, sore throat, tinnitus, changes in vision. 12 

point review of system otherwise negative





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 

gastroesophageal reflux disease, exploratory laparotomy for peritonitis that is 

post cholecystectomy presents to the emergency room with 3 day history of 

nausea, vomiting, abdominal distention, unable to take oral intake without prior

 episodes in the past. Patient denies any history of hernias. No medications 

were taken at home, abdominal discomfort, worse when she ambulates better when 

she lays still in the ER she was found to have bowel obstruction, general 

surgeon, Dr. Costa consulted and recommended conservative management initially 

with nothing by mouth status IV fluids, nasogastric tube placement to low 

intermittent suction. Her x-rays showed no improvement and so she was brought to

 the OR 1/26 with Dr. Costa. Patient underwent laparoscopic lysis of adhesions.

 Surgery went well without complications. Today patient was adamant to be 

discharged on leave even though she had not had a bowel movement. She insisted 

that given her background in nursing she feels comfortable going home because 

she has lots of gas movement despite recommendations from myself and Dr. Costa 

for her to stay overnight she refused. I do trust that she would return to the 

emergency department or contact her surgeon Dr. Costa should any complications 

occur or should she fail to have a bowel movement in the next 12-24 hours. I 

discussed thoroughly discussed with the patient along with the dangers and 

consultations of leaving early and she verbalizes understanding and promises to 

return immediately should she fail to have a bowel movement or develop any 

abdominal symptoms.





DISCHARGE MEDICATIONS: Please see below.


 


ALLERGIES: Please see below.





PHYSICAL EXAMINATION ON DISCHARGE:


VITAL SIGNS: See below


Constitutional: Awake and alert, in no apparent distress


ENT: Sclera are clear. Mucosa is moist. NG tube in place


Respiratory:  Lungs CTA bilaterally.  No respiratory distress. No use of 

accessory muscles.


Cardiovascular: RRR S1 and S2 are normal, no murmur


Gastrointestinal: Abdomen is soft, not distended, normal bowel sounds in all 4 

quadrants, laparoscopic incisions sites clean and dressed.


Musculoskeletal: No edema. No joint deformities. 


Neurologic: No focal neurological deficit.  


Mental Status: A&O x3, normal affect 


Skin: Warm, dry 








LABORATORY DATA: Please see below.





IMAGING: See chart





PROGNOSIS: Fair





ACTIVITY: [As tolerated].





DIET: Regular diet





DISPOSITION: Home 





DISCHARGE INSTRUCTIONS:


Please follow up with your primary care physician within 1 week from discharge. 


If you do not have one, please follow up with us to schedule an appointment.


Please keep all of your follow up appointments. Please call central to book your

appointments with hospital specialists.


Please take all your medications as prescribed. 


Please call/come to Clinic or go to the Emergency Department if - Temp >101, 

intractable Nausea/Vomiting, Diarrhea, Mouth sores, Headaches, Altered mental 

status, Seizures, sudden onset of swelling, bleeding, shortness of breath or 

chest pain.


Return to emergency department immediately if he develop any abdominal symptoms 

are felt to have a bowel movement in the next 12 hours.





ITEMS TO FOLLOWUP ON ON OUTPATIENT:


1. Follow-up with Dr. Costa neurosurgeon in his clinic at the scheduled 

appointment


2. Follow up with your primary care doctor in the next 1-3 days.





DISCHARGE CONDITION: [Stable].





TIME SPENT ON DISCHARGE: Greater than 40 minutes.





Vital Signs/I&Os





Vital Signs








  Date Time  Temp Pulse Resp B/P (MAP) Pulse Ox O2 Delivery O2 Flow Rate FiO2


 


1/27/21 14:00 99.7 63 16 99/63 (75) 97 Room Air  


 


1/26/21 22:30       2.0 


 


1/26/21 18:44        100














I&O- Last 24 Hours up to 6 AM 


 


 1/27/21





 05:59


 


Intake Total 3490 ml


 


Output Total 1300 ml


 


Balance 2190 ml











Laboratory Data


Labs 24H


Laboratory Tests 2


1/27/21 07:31: 


Immature Granulocyte % (Auto) 0.3, Neutrophils (%) (Auto) 65.3, Lymphocytes (%) 

(Auto) 21.4L, Monocytes (%) (Auto) 11.8H, Eosinophils (%) (Auto) 0.9, Basophils 

(%) (Auto) 0.3, Neutrophils # (Auto) 4.8, Lymphocytes # (Auto) 1.6, Monocytes # 

(Auto) 0.9H, Eosinophils # (Auto) 0.1, Basophils # (Auto) 0.0, Nucleated Red 

Blood Cells % (auto) 0.0, Anion Gap 9, Glomerular Filtration Rate > 60.0, 

Calcium Level 9.0, Magnesium Level 2.0


CBC/BMP


Laboratory Tests


1/27/21 07:31











Discharge Medications


Scheduled


Lansoprazole (Lansoprazole) 30 Mg Capsule.dr, 30 MG PO DAILY, (Reported)


Valsartan (Valsartan) 320 Mg Tablet, 320 MG PO DAILY, (Reported)





Scheduled PRN


Hydrochlorothiazide (Hydrochlorothiazide) 12.5 Mg Tablet, 12.5 MG PO DAILY PRN 

for FLUID RETENTION, (Reported)





Allergies


Coded Allergies:  


     morphine (Verified  Allergy, Mild, Itching, 1/24/21)


     NSAIDS (Non-Steroidal Anti-Inflamma (Verified  Allergy, Unknown, Hx of 

Gastritis, 1/24/21)











ABELARDO EWING MD              Jan 27, 2021 16:22

## 2021-01-27 NOTE — IPNPDOC
Text Note


Date of Service


The patient was seen on 1/27/21.





NOTE


No acute events overnight. She feels good post-op. She is tolerating clq diet 

with the NG clamped. No nausea, emesis, or fevers. She has lots of flatus, but 

no BM this am.  





VSSAF





NAD





abd - soft, tenderness appropriate, and non-distended





labs - below





A) 64y/o female with partial vs. complete sbo secondary to adhesions resolved 

s/p RA DAVIDSON





P)full liquid diet


ambulate


dc NGT


I recommend flq diet today, and then d/c home in the AM if she is tolerating it 

ok without any nausea, emesis, or distention. However, she is very adamant about

going home, and I feel that she can be trusted to return if she gets worse. She 

fully understands my recommendations, and that she is going against them. I am 

ok with her going this afternoon as long as she will call me with any problems. 







James Costa DO





VS,Bryan, I+O


VS, Bryan, I+O


Laboratory Tests


1/27/21 07:31











Vital Signs








  Date Time  Temp Pulse Resp B/P (MAP) Pulse Ox O2 Delivery O2 Flow Rate FiO2


 


1/27/21 10:00 99.5 60 17 116/72 (87) 98 Room Air  


 


1/26/21 22:30       2.0 


 


1/26/21 18:44        100














I&O- Last 24 Hours up to 6 AM 


 


 1/27/21





 06:00


 


Intake Total 3490 ml


 


Output Total 1200 ml


 


Balance 2290 ml

















MEET COSTA DO            Jan 27, 2021 10:46

## 2022-02-09 ENCOUNTER — HOSPITAL ENCOUNTER (OUTPATIENT)
Dept: HOSPITAL 53 - M SFHCLERA | Age: 67
End: 2022-02-09
Attending: FAMILY MEDICINE
Payer: MEDICARE

## 2022-02-09 DIAGNOSIS — R09.81: Primary | ICD-10-CM

## 2023-01-13 ENCOUNTER — HOSPITAL ENCOUNTER (OUTPATIENT)
Dept: HOSPITAL 53 - M ED | Age: 68
LOS: 2 days | Discharge: HOME | End: 2023-01-15
Attending: SURGERY
Payer: COMMERCIAL

## 2023-01-13 VITALS — WEIGHT: 129.85 LBS | BODY MASS INDEX: 23.01 KG/M2 | HEIGHT: 63 IN

## 2023-01-13 VITALS — DIASTOLIC BLOOD PRESSURE: 61 MMHG | SYSTOLIC BLOOD PRESSURE: 100 MMHG

## 2023-01-13 DIAGNOSIS — Z87.19: ICD-10-CM

## 2023-01-13 DIAGNOSIS — Z79.2: ICD-10-CM

## 2023-01-13 DIAGNOSIS — F17.210: ICD-10-CM

## 2023-01-13 DIAGNOSIS — Z79.899: ICD-10-CM

## 2023-01-13 DIAGNOSIS — K35.20: Primary | ICD-10-CM

## 2023-01-13 DIAGNOSIS — Z88.8: ICD-10-CM

## 2023-01-13 DIAGNOSIS — Z88.5: ICD-10-CM

## 2023-01-13 DIAGNOSIS — I10: ICD-10-CM

## 2023-01-13 DIAGNOSIS — Z90.49: ICD-10-CM

## 2023-01-13 LAB
ALBUMIN SERPL BCG-MCNC: 3.7 G/DL (ref 3.2–5.2)
ALP SERPL-CCNC: 107 U/L (ref 46–116)
ALT SERPL W P-5'-P-CCNC: 24 U/L (ref 7–40)
AST SERPL-CCNC: 21 U/L (ref ?–34)
BASOPHILS # BLD AUTO: 0.1 10^3/UL (ref 0–0.2)
BASOPHILS NFR BLD AUTO: 0.4 % (ref 0–1)
BILIRUB CONJ SERPL-MCNC: 0.2 MG/DL (ref ?–0.4)
BILIRUB SERPL-MCNC: 0.5 MG/DL (ref 0.3–1.2)
BUN SERPL-MCNC: 12 MG/DL (ref 9–23)
CALCIUM SERPL-MCNC: 9.4 MG/DL (ref 8.3–10.6)
CHLORIDE SERPL-SCNC: 99 MMOL/L (ref 98–107)
CO2 SERPL-SCNC: 28 MMOL/L (ref 20–31)
CREAT SERPL-MCNC: 0.59 MG/DL (ref 0.55–1.3)
EOSINOPHIL # BLD AUTO: 0 10^3/UL (ref 0–0.5)
EOSINOPHIL NFR BLD AUTO: 0.1 % (ref 0–3)
GFR SERPL CREATININE-BSD FRML MDRD: > 60 ML/MIN/{1.73_M2} (ref 45–?)
GLUCOSE SERPL-MCNC: 101 MG/DL (ref 74–106)
HCT VFR BLD AUTO: 40 % (ref 36–47)
HGB BLD-MCNC: 13 G/DL (ref 12–15.5)
LIPASE SERPL-CCNC: 28 U/L (ref 12–53)
LYMPHOCYTES # BLD AUTO: 1.2 10^3/UL (ref 1.5–5)
LYMPHOCYTES NFR BLD AUTO: 8.8 % (ref 24–44)
MCH RBC QN AUTO: 29.4 PG (ref 27–33)
MCHC RBC AUTO-ENTMCNC: 32.5 G/DL (ref 32–36.5)
MCV RBC AUTO: 90.5 FL (ref 80–96)
MONOCYTES # BLD AUTO: 1.4 10^3/UL (ref 0–0.8)
MONOCYTES NFR BLD AUTO: 10.6 % (ref 2–8)
NEUTROPHILS # BLD AUTO: 10.9 10^3/UL (ref 1.5–8.5)
NEUTROPHILS NFR BLD AUTO: 79.7 % (ref 36–66)
PLATELET # BLD AUTO: 393 10^3/UL (ref 150–450)
POTASSIUM SERPL-SCNC: 4.3 MMOL/L (ref 3.5–5.1)
PROT SERPL-MCNC: 6.6 G/DL (ref 5.7–8.2)
RBC # BLD AUTO: 4.42 10^6/UL (ref 4–5.4)
RSV RNA NPH QL NAA+PROBE: NEGATIVE
SODIUM SERPL-SCNC: 133 MMOL/L (ref 136–145)
WBC # BLD AUTO: 13.6 10^3/UL (ref 4–10)

## 2023-01-13 PROCEDURE — 96374 THER/PROPH/DIAG INJ IV PUSH: CPT

## 2023-01-13 PROCEDURE — 83690 ASSAY OF LIPASE: CPT

## 2023-01-13 PROCEDURE — 88304 TISSUE EXAM BY PATHOLOGIST: CPT

## 2023-01-13 PROCEDURE — 44970 LAPAROSCOPY APPENDECTOMY: CPT

## 2023-01-13 PROCEDURE — 36415 COLL VENOUS BLD VENIPUNCTURE: CPT

## 2023-01-13 PROCEDURE — 74177 CT ABD & PELVIS W/CONTRAST: CPT

## 2023-01-13 PROCEDURE — 85027 COMPLETE CBC AUTOMATED: CPT

## 2023-01-13 PROCEDURE — 96375 TX/PRO/DX INJ NEW DRUG ADDON: CPT

## 2023-01-13 PROCEDURE — 85025 COMPLETE CBC W/AUTO DIFF WBC: CPT

## 2023-01-13 PROCEDURE — 87631 RESP VIRUS 3-5 TARGETS: CPT

## 2023-01-13 PROCEDURE — 80048 BASIC METABOLIC PNL TOTAL CA: CPT

## 2023-01-13 PROCEDURE — 80076 HEPATIC FUNCTION PANEL: CPT

## 2023-01-13 PROCEDURE — 99284 EMERGENCY DEPT VISIT MOD MDM: CPT

## 2023-01-14 LAB
BUN SERPL-MCNC: 9 MG/DL (ref 9–23)
CALCIUM SERPL-MCNC: 7.7 MG/DL (ref 8.3–10.6)
CHLORIDE SERPL-SCNC: 104 MMOL/L (ref 98–107)
CO2 SERPL-SCNC: 24 MMOL/L (ref 20–31)
CREAT SERPL-MCNC: 0.58 MG/DL (ref 0.55–1.3)
GFR SERPL CREATININE-BSD FRML MDRD: > 60 ML/MIN/{1.73_M2} (ref 45–?)
GLUCOSE SERPL-MCNC: 106 MG/DL (ref 74–106)
HCT VFR BLD AUTO: 31.5 % (ref 36–47)
HGB BLD-MCNC: 10.4 G/DL (ref 12–15.5)
MCH RBC QN AUTO: 30.1 PG (ref 27–33)
MCHC RBC AUTO-ENTMCNC: 33 G/DL (ref 32–36.5)
MCV RBC AUTO: 91.3 FL (ref 80–96)
PLATELET # BLD AUTO: 303 10^3/UL (ref 150–450)
POTASSIUM SERPL-SCNC: 3.7 MMOL/L (ref 3.5–5.1)
RBC # BLD AUTO: 3.45 10^6/UL (ref 4–5.4)
SODIUM SERPL-SCNC: 135 MMOL/L (ref 136–145)
WBC # BLD AUTO: 9.8 10^3/UL (ref 4–10)

## 2023-01-15 LAB
BUN SERPL-MCNC: 9 MG/DL (ref 9–23)
CALCIUM SERPL-MCNC: 7.9 MG/DL (ref 8.3–10.6)
CHLORIDE SERPL-SCNC: 101 MMOL/L (ref 98–107)
CO2 SERPL-SCNC: 26 MMOL/L (ref 20–31)
CREAT SERPL-MCNC: 0.61 MG/DL (ref 0.55–1.3)
GFR SERPL CREATININE-BSD FRML MDRD: > 60 ML/MIN/{1.73_M2} (ref 45–?)
GLUCOSE SERPL-MCNC: 104 MG/DL (ref 74–106)
HCT VFR BLD AUTO: 31.6 % (ref 36–47)
HGB BLD-MCNC: 10.4 G/DL (ref 12–15.5)
MCH RBC QN AUTO: 30.2 PG (ref 27–33)
MCHC RBC AUTO-ENTMCNC: 32.9 G/DL (ref 32–36.5)
MCV RBC AUTO: 91.9 FL (ref 80–96)
PLATELET # BLD AUTO: 300 10^3/UL (ref 150–450)
POTASSIUM SERPL-SCNC: 3.8 MMOL/L (ref 3.5–5.1)
RBC # BLD AUTO: 3.44 10^6/UL (ref 4–5.4)
SODIUM SERPL-SCNC: 134 MMOL/L (ref 136–145)
WBC # BLD AUTO: 8.8 10^3/UL (ref 4–10)

## 2023-06-16 ENCOUNTER — HOSPITAL ENCOUNTER (EMERGENCY)
Dept: HOSPITAL 53 - M ED | Age: 68
Discharge: HOME | End: 2023-06-16
Payer: MEDICARE

## 2023-06-16 VITALS — SYSTOLIC BLOOD PRESSURE: 136 MMHG | DIASTOLIC BLOOD PRESSURE: 75 MMHG

## 2023-06-16 VITALS — OXYGEN SATURATION: 96 %

## 2023-06-16 VITALS — TEMPERATURE: 98.9 F

## 2023-06-16 DIAGNOSIS — Z88.5: ICD-10-CM

## 2023-06-16 DIAGNOSIS — R07.89: Primary | ICD-10-CM

## 2023-06-16 DIAGNOSIS — K21.9: ICD-10-CM

## 2023-06-16 DIAGNOSIS — Z79.899: ICD-10-CM

## 2023-06-16 DIAGNOSIS — I10: ICD-10-CM

## 2023-06-16 DIAGNOSIS — E78.5: ICD-10-CM

## 2023-06-16 DIAGNOSIS — E87.1: ICD-10-CM

## 2023-06-16 DIAGNOSIS — Z88.6: ICD-10-CM

## 2023-06-16 DIAGNOSIS — Z79.82: ICD-10-CM

## 2023-06-16 DIAGNOSIS — F17.200: ICD-10-CM

## 2023-06-16 LAB
APTT BLD: 29.1 SECONDS (ref 24.8–34.2)
BASOPHILS # BLD AUTO: 0.1 10^3/UL (ref 0–0.2)
BASOPHILS NFR BLD AUTO: 0.7 % (ref 0–1)
BUN SERPL-MCNC: 10 MG/DL (ref 9–23)
CALCIUM SERPL-MCNC: 9.5 MG/DL (ref 8.3–10.6)
CHLORIDE SERPL-SCNC: 93 MMOL/L (ref 98–107)
CK MB CFR.DF SERPL CALC: 1.08
CK MB CFR.DF SERPL CALC: 1.11
CK MB CFR.DF SERPL CALC: 1.17
CK MB SERPL-MCNC: < 1 NG/ML (ref ?–3.6)
CK SERPL-CCNC: 85 U/L (ref 34–145)
CK SERPL-CCNC: 90 U/L (ref 34–145)
CK SERPL-CCNC: 92 U/L (ref 34–145)
CO2 SERPL-SCNC: 26 MMOL/L (ref 20–31)
CREAT SERPL-MCNC: 0.5 MG/DL (ref 0.55–1.3)
EOSINOPHIL # BLD AUTO: 0 10^3/UL (ref 0–0.5)
EOSINOPHIL NFR BLD AUTO: 0.4 % (ref 0–3)
GFR SERPL CREATININE-BSD FRML MDRD: > 60 ML/MIN/{1.73_M2} (ref 45–?)
GLUCOSE SERPL-MCNC: 91 MG/DL (ref 74–106)
HCT VFR BLD AUTO: 38.5 % (ref 36–47)
HGB BLD-MCNC: 13.2 G/DL (ref 12–15.5)
INR PPP: 0.92
LYMPHOCYTES # BLD AUTO: 1.3 10^3/UL (ref 1.5–5)
LYMPHOCYTES NFR BLD AUTO: 16.7 % (ref 24–44)
MCH RBC QN AUTO: 29.5 PG (ref 27–33)
MCHC RBC AUTO-ENTMCNC: 34.3 G/DL (ref 32–36.5)
MCV RBC AUTO: 85.9 FL (ref 80–96)
MONOCYTES # BLD AUTO: 0.7 10^3/UL (ref 0–0.8)
MONOCYTES NFR BLD AUTO: 9.5 % (ref 2–8)
NEUTROPHILS # BLD AUTO: 5.5 10^3/UL (ref 1.5–8.5)
NEUTROPHILS NFR BLD AUTO: 72.4 % (ref 36–66)
PLATELET # BLD AUTO: 399 10^3/UL (ref 150–450)
POTASSIUM SERPL-SCNC: 4.1 MMOL/L (ref 3.5–5.1)
PROTHROMBIN TIME: 12.6 SECONDS (ref 12.5–14.5)
RBC # BLD AUTO: 4.48 10^6/UL (ref 4–5.4)
SODIUM SERPL-SCNC: 128 MMOL/L (ref 136–145)
WBC # BLD AUTO: 7.5 10^3/UL (ref 4–10)

## 2023-06-19 ENCOUNTER — HOSPITAL ENCOUNTER (OUTPATIENT)
Dept: HOSPITAL 53 - M WUC | Age: 68
End: 2023-06-19
Attending: FAMILY MEDICINE
Payer: MEDICARE

## 2023-06-19 DIAGNOSIS — Z79.899: ICD-10-CM

## 2023-06-19 DIAGNOSIS — R79.89: ICD-10-CM

## 2023-06-19 DIAGNOSIS — E78.5: Primary | ICD-10-CM

## 2023-06-19 DIAGNOSIS — I10: ICD-10-CM

## 2023-06-19 DIAGNOSIS — R73.03: ICD-10-CM

## 2023-06-19 DIAGNOSIS — R79.0: ICD-10-CM

## 2023-06-19 LAB
25(OH)D3 SERPL-MCNC: 25.6 NG/ML (ref 20–100)
ALBUMIN SERPL BCG-MCNC: 4.3 G/DL (ref 3.2–5.2)
ALP SERPL-CCNC: 97 U/L (ref 46–116)
ALT SERPL W P-5'-P-CCNC: 19 U/L (ref 7–40)
AST SERPL-CCNC: 16 U/L (ref ?–34)
BILIRUB SERPL-MCNC: 0.6 MG/DL (ref 0.3–1.2)
BUN SERPL-MCNC: 11 MG/DL (ref 9–23)
CALCIUM SERPL-MCNC: 9.8 MG/DL (ref 8.3–10.6)
CHLORIDE SERPL-SCNC: 101 MMOL/L (ref 98–107)
CHOLEST SERPL-MCNC: 250 MG/DL (ref ?–200)
CHOLEST/HDLC SERPL: 5 {RATIO} (ref ?–5)
CO2 SERPL-SCNC: 27 MMOL/L (ref 20–31)
CREAT SERPL-MCNC: 0.53 MG/DL (ref 0.55–1.3)
EST. AVERAGE GLUCOSE BLD GHB EST-MCNC: 108 MG/DL (ref 60–110)
GFR SERPL CREATININE-BSD FRML MDRD: > 60 ML/MIN/{1.73_M2} (ref 45–?)
GLUCOSE SERPL-MCNC: 76 MG/DL (ref 74–106)
HDLC SERPL-MCNC: 50 MG/DL (ref 40–?)
LDLC SERPL CALC-MCNC: 167.6 MG/DL (ref ?–100)
MAGNESIUM SERPL-MCNC: 2.3 MG/DL (ref 1.8–2.4)
NONHDLC SERPL-MCNC: 200 MG/DL
POTASSIUM SERPL-SCNC: 4.2 MMOL/L (ref 3.5–5.1)
PROT SERPL-MCNC: 7.1 G/DL (ref 5.7–8.2)
SODIUM SERPL-SCNC: 135 MMOL/L (ref 136–145)
TRIGL SERPL-MCNC: 162 MG/DL (ref ?–150)

## 2023-08-18 ENCOUNTER — HOSPITAL ENCOUNTER (OUTPATIENT)
Dept: HOSPITAL 53 - M CARPUL | Age: 68
End: 2023-08-18
Attending: FAMILY MEDICINE
Payer: MEDICARE

## 2023-08-18 ENCOUNTER — HOSPITAL ENCOUNTER (OUTPATIENT)
Dept: HOSPITAL 53 - M WUC | Age: 68
End: 2023-08-18
Attending: INTERNAL MEDICINE
Payer: MEDICARE

## 2023-08-18 DIAGNOSIS — E87.1: Primary | ICD-10-CM

## 2023-08-18 DIAGNOSIS — R06.02: ICD-10-CM

## 2023-08-18 DIAGNOSIS — E87.1: ICD-10-CM

## 2023-08-18 DIAGNOSIS — I65.23: Primary | ICD-10-CM

## 2023-08-18 LAB
BUN SERPL-MCNC: 10 MG/DL (ref 9–23)
CALCIUM SERPL-MCNC: 9.3 MG/DL (ref 8.3–10.6)
CHLORIDE SERPL-SCNC: 102 MMOL/L (ref 98–107)
CO2 SERPL-SCNC: 29 MMOL/L (ref 20–31)
CREAT SERPL-MCNC: 0.59 MG/DL (ref 0.55–1.3)
GFR SERPL CREATININE-BSD FRML MDRD: > 60 ML/MIN/{1.73_M2} (ref 45–?)
GLUCOSE SERPL-MCNC: 88 MG/DL (ref 74–106)
POTASSIUM SERPL-SCNC: 4.4 MMOL/L (ref 3.5–5.1)
SODIUM SERPL-SCNC: 137 MMOL/L (ref 136–145)

## 2024-09-18 ENCOUNTER — HOSPITAL ENCOUNTER (OUTPATIENT)
Dept: HOSPITAL 53 - M SFHCLERA | Age: 69
End: 2024-09-18
Attending: FAMILY MEDICINE
Payer: COMMERCIAL

## 2024-09-18 DIAGNOSIS — E78.5: ICD-10-CM

## 2024-09-18 DIAGNOSIS — I10: Primary | ICD-10-CM

## 2024-09-18 LAB
ALBUMIN SERPL BCG-MCNC: 3.8 G/DL (ref 3.2–5.2)
ALP SERPL-CCNC: 112 U/L (ref 46–116)
ALT SERPL W P-5'-P-CCNC: 18 U/L (ref 7–40)
AST SERPL-CCNC: 16 U/L (ref ?–34)
BILIRUB SERPL-MCNC: 0.3 MG/DL (ref 0.3–1.2)
BUN SERPL-MCNC: 10 MG/DL (ref 9–23)
CALCIUM SERPL-MCNC: 9.8 MG/DL (ref 8.3–10.6)
CHLORIDE SERPL-SCNC: 104 MMOL/L (ref 98–107)
CHOLEST SERPL-MCNC: 295 MG/DL (ref ?–200)
CHOLEST/HDLC SERPL: 6.68 {RATIO} (ref ?–5)
CO2 SERPL-SCNC: 31 MMOL/L (ref 20–31)
CREAT SERPL-MCNC: 0.61 MG/DL (ref 0.55–1.3)
GFR SERPL CREATININE-BSD FRML MDRD: > 60 ML/MIN/{1.73_M2} (ref 45–?)
GLUCOSE SERPL-MCNC: 49 MG/DL (ref 74–106)
HDLC SERPL-MCNC: 44.1 MG/DL (ref 40–?)
LDLC SERPL CALC-MCNC: 214.5 MG/DL (ref ?–100)
NONHDLC SERPL-MCNC: 250.9 MG/DL
POTASSIUM SERPL-SCNC: 4.7 MMOL/L (ref 3.5–5.1)
PROT SERPL-MCNC: 7.1 G/DL (ref 5.7–8.2)
SODIUM SERPL-SCNC: 137 MMOL/L (ref 136–145)
TRIGL SERPL-MCNC: 182 MG/DL (ref ?–150)